# Patient Record
Sex: MALE | Race: WHITE | NOT HISPANIC OR LATINO | URBAN - METROPOLITAN AREA
[De-identification: names, ages, dates, MRNs, and addresses within clinical notes are randomized per-mention and may not be internally consistent; named-entity substitution may affect disease eponyms.]

---

## 2017-12-27 ENCOUNTER — NEW PATIENT (OUTPATIENT)
Dept: URBAN - METROPOLITAN AREA CLINIC 85 | Facility: CLINIC | Age: 57
End: 2017-12-27
Payer: COMMERCIAL

## 2017-12-27 DIAGNOSIS — E11.39 TYPE 2 DIABETES MELLITUS WITH OPHTHALMIC COMPLICAT: Primary | ICD-10-CM

## 2017-12-27 DIAGNOSIS — E11.3293 DIABETES MELLITUS TYPE 2 WITH MILD NON-PROLIFERATI: ICD-10-CM

## 2017-12-27 DIAGNOSIS — Z79.4 LONG TERM (CURRENT) USE OF INSULIN: ICD-10-CM

## 2017-12-27 PROCEDURE — 92004 COMPRE OPH EXAM NEW PT 1/>: CPT | Performed by: OPTOMETRIST

## 2017-12-27 ASSESSMENT — KERATOMETRY
OS: 44.00
OD: 44.13

## 2017-12-27 ASSESSMENT — INTRAOCULAR PRESSURE
OS: 11
OD: 11

## 2017-12-27 ASSESSMENT — VISUAL ACUITY
OD: 20/25
OS: 20/30

## 2019-01-31 ENCOUNTER — FOLLOW UP ESTABLISHED (OUTPATIENT)
Dept: URBAN - METROPOLITAN AREA CLINIC 85 | Facility: CLINIC | Age: 59
End: 2019-01-31
Payer: COMMERCIAL

## 2019-01-31 DIAGNOSIS — H52.4 PRESBYOPIA: ICD-10-CM

## 2019-01-31 PROCEDURE — 92134 CPTRZ OPH DX IMG PST SGM RTA: CPT | Performed by: OPTOMETRIST

## 2019-01-31 PROCEDURE — 92014 COMPRE OPH EXAM EST PT 1/>: CPT | Performed by: OPTOMETRIST

## 2019-01-31 ASSESSMENT — VISUAL ACUITY
OD: 20/20
OS: 20/25

## 2019-01-31 ASSESSMENT — KERATOMETRY
OS: 44.00
OD: 44.25

## 2019-01-31 ASSESSMENT — INTRAOCULAR PRESSURE
OS: 10
OD: 11

## 2019-12-27 ENCOUNTER — FOLLOW UP ESTABLISHED (OUTPATIENT)
Dept: URBAN - METROPOLITAN AREA CLINIC 85 | Facility: CLINIC | Age: 59
End: 2019-12-27
Payer: COMMERCIAL

## 2019-12-27 DIAGNOSIS — H25.13 DIABETES MELLITUS TYPE 2 WITH MILD NON-PROLIFERATI: ICD-10-CM

## 2019-12-27 PROCEDURE — 92134 CPTRZ OPH DX IMG PST SGM RTA: CPT | Performed by: OPTOMETRIST

## 2019-12-27 PROCEDURE — 92014 COMPRE OPH EXAM EST PT 1/>: CPT | Performed by: OPTOMETRIST

## 2019-12-27 ASSESSMENT — INTRAOCULAR PRESSURE
OD: 10
OS: 11

## 2019-12-27 ASSESSMENT — VISUAL ACUITY
OS: 20/25
OD: 20/30

## 2019-12-27 ASSESSMENT — KERATOMETRY
OS: 43.88
OD: 44.00

## 2020-12-30 ENCOUNTER — FOLLOW UP ESTABLISHED (OUTPATIENT)
Dept: URBAN - METROPOLITAN AREA CLINIC 85 | Facility: CLINIC | Age: 60
End: 2020-12-30
Payer: COMMERCIAL

## 2020-12-30 PROCEDURE — 92014 COMPRE OPH EXAM EST PT 1/>: CPT | Performed by: OPTOMETRIST

## 2020-12-30 PROCEDURE — 92134 CPTRZ OPH DX IMG PST SGM RTA: CPT | Performed by: OPTOMETRIST

## 2020-12-30 ASSESSMENT — VISUAL ACUITY
OD: 20/30
OS: 20/25

## 2020-12-30 ASSESSMENT — INTRAOCULAR PRESSURE
OD: 12
OS: 13

## 2021-12-22 ENCOUNTER — OFFICE VISIT (OUTPATIENT)
Dept: URBAN - METROPOLITAN AREA CLINIC 85 | Facility: CLINIC | Age: 61
End: 2021-12-22
Payer: COMMERCIAL

## 2021-12-22 PROCEDURE — 92134 CPTRZ OPH DX IMG PST SGM RTA: CPT | Performed by: OPTOMETRIST

## 2021-12-22 PROCEDURE — 92014 COMPRE OPH EXAM EST PT 1/>: CPT | Performed by: OPTOMETRIST

## 2021-12-22 ASSESSMENT — KERATOMETRY
OD: 44.13
OS: 44.00

## 2021-12-22 ASSESSMENT — INTRAOCULAR PRESSURE
OD: 11
OS: 11

## 2021-12-22 ASSESSMENT — VISUAL ACUITY
OD: 20/30
OS: 20/25

## 2021-12-22 NOTE — IMPRESSION/PLAN
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 Plan: Diabetes type II: moderate background diabetic retinopathy, no signs of neovascularization noted. Will refer patient for a retinal consult to determine if treatment is necessary. Discussed ocular and systemic benefits of maintaining blood sugar control.

## 2021-12-22 NOTE — IMPRESSION/PLAN
Impression: Age-related nuclear cataract, bilateral: H25.13. Plan: Discussed findings. Discussed option of CE/IOL OU. Patient understands cataract effect on vision. Patient defers to have  CE w/IOL consult with Dr. Alisa Snyder at this time. Continue to monitor. RTC 1 year CEE or ASAP if decreased VA or pain.

## 2023-02-08 ENCOUNTER — OFFICE VISIT (OUTPATIENT)
Dept: URBAN - METROPOLITAN AREA CLINIC 85 | Facility: CLINIC | Age: 63
End: 2023-02-08
Payer: COMMERCIAL

## 2023-02-08 DIAGNOSIS — Z79.4 LONG TERM (CURRENT) USE OF INSULIN: ICD-10-CM

## 2023-02-08 DIAGNOSIS — H43.393 OTHER VITREOUS OPACITIES, BILATERAL: ICD-10-CM

## 2023-02-08 DIAGNOSIS — E11.3293 TYPE 2 DIABETES MELLITUS WITH MILD NONPROLIFERATIVE DIABETIC RETINOPATHY WITHOUT MACULAR EDEMA, BILATERAL: Primary | ICD-10-CM

## 2023-02-08 DIAGNOSIS — H25.13 AGE-RELATED NUCLEAR CATARACT, BILATERAL: ICD-10-CM

## 2023-02-08 PROCEDURE — 92134 CPTRZ OPH DX IMG PST SGM RTA: CPT | Performed by: OPTOMETRIST

## 2023-02-08 PROCEDURE — 92014 COMPRE OPH EXAM EST PT 1/>: CPT | Performed by: OPTOMETRIST

## 2023-02-08 ASSESSMENT — INTRAOCULAR PRESSURE
OD: 11
OS: 11

## 2023-02-08 ASSESSMENT — KERATOMETRY
OD: 44.13
OS: 44.13

## 2023-02-08 ASSESSMENT — VISUAL ACUITY
OS: 20/20
OD: 20/25

## 2023-02-08 NOTE — IMPRESSION/PLAN
Impression: Type 2 diabetes mellitus with mild nonproliferative diabetic retinopathy without macular edema, bilateral: W13.52. Plan: Discussed with patient importance of good blood sugar control and compliance with regular visits with PCP, compliance with medications, healthy diet and daily exercise. RTC 1 year CEE with possible 5 line OCT or RTC ASAP should they experience a decrease in vision, pain, or any worsening of condition.

## 2024-02-12 ENCOUNTER — OFFICE VISIT (OUTPATIENT)
Dept: URBAN - METROPOLITAN AREA CLINIC 85 | Facility: CLINIC | Age: 64
End: 2024-02-12
Payer: COMMERCIAL

## 2024-02-12 DIAGNOSIS — E11.3293 TYPE 2 DIABETES MELLITUS WITH MILD NONPROLIFERATIVE DIABETIC RETINOPATHY WITHOUT MACULAR EDEMA, BILATERAL: Primary | ICD-10-CM

## 2024-02-12 DIAGNOSIS — Z79.4 LONG TERM (CURRENT) USE OF INSULIN: ICD-10-CM

## 2024-02-12 DIAGNOSIS — H43.393 OTHER VITREOUS OPACITIES, BILATERAL: ICD-10-CM

## 2024-02-12 DIAGNOSIS — H25.13 AGE-RELATED NUCLEAR CATARACT, BILATERAL: ICD-10-CM

## 2024-02-12 PROCEDURE — 92014 COMPRE OPH EXAM EST PT 1/>: CPT | Performed by: OPTOMETRIST

## 2024-02-12 PROCEDURE — 92134 CPTRZ OPH DX IMG PST SGM RTA: CPT | Performed by: OPTOMETRIST

## 2024-02-12 ASSESSMENT — INTRAOCULAR PRESSURE
OD: 11
OS: 11

## 2024-02-12 ASSESSMENT — KERATOMETRY
OD: 44.13
OS: 44.25

## 2024-02-12 ASSESSMENT — VISUAL ACUITY
OS: 20/30
OD: 20/25

## 2025-01-09 ENCOUNTER — OFFICE VISIT (OUTPATIENT)
Dept: URBAN - METROPOLITAN AREA CLINIC 85 | Facility: CLINIC | Age: 65
End: 2025-01-09
Payer: MEDICARE

## 2025-01-09 DIAGNOSIS — E11.3293 TYPE 2 DIABETES MELLITUS WITH MILD NONPROLIFERATIVE DIABETIC RETINOPATHY WITHOUT MACULAR EDEMA, BILATERAL: Primary | ICD-10-CM

## 2025-01-09 DIAGNOSIS — Z79.4 LONG TERM (CURRENT) USE OF INSULIN: ICD-10-CM

## 2025-01-09 DIAGNOSIS — H25.13 AGE-RELATED NUCLEAR CATARACT, BILATERAL: ICD-10-CM

## 2025-01-09 DIAGNOSIS — H43.393 OTHER VITREOUS OPACITIES, BILATERAL: ICD-10-CM

## 2025-01-09 PROCEDURE — 92134 CPTRZ OPH DX IMG PST SGM RTA: CPT | Performed by: OPTOMETRIST

## 2025-01-09 PROCEDURE — 92014 COMPRE OPH EXAM EST PT 1/>: CPT | Performed by: OPTOMETRIST

## 2025-01-09 ASSESSMENT — VISUAL ACUITY
OS: 20/20
OD: 20/25

## 2025-01-09 ASSESSMENT — INTRAOCULAR PRESSURE
OS: 13
OD: 13

## 2025-01-09 ASSESSMENT — KERATOMETRY
OD: 43.88
OS: 44.38